# Patient Record
Sex: MALE | Race: WHITE | NOT HISPANIC OR LATINO | URBAN - METROPOLITAN AREA
[De-identification: names, ages, dates, MRNs, and addresses within clinical notes are randomized per-mention and may not be internally consistent; named-entity substitution may affect disease eponyms.]

---

## 2025-05-02 ENCOUNTER — APPOINTMENT (OUTPATIENT)
Dept: DERMATOLOGY | Facility: CLINIC | Age: 80
End: 2025-05-02

## 2025-05-02 DIAGNOSIS — L40.9 PSORIASIS: ICD-10-CM

## 2025-05-02 DIAGNOSIS — L21.9 SEBORRHEIC DERMATITIS: Primary | ICD-10-CM

## 2025-05-02 PROCEDURE — 1159F MED LIST DOCD IN RCRD: CPT | Performed by: STUDENT IN AN ORGANIZED HEALTH CARE EDUCATION/TRAINING PROGRAM

## 2025-05-02 PROCEDURE — 99204 OFFICE O/P NEW MOD 45 MIN: CPT | Performed by: STUDENT IN AN ORGANIZED HEALTH CARE EDUCATION/TRAINING PROGRAM

## 2025-05-02 RX ORDER — BETAMETHASONE VALERATE 1 MG/G
CREAM TOPICAL 2 TIMES DAILY
Qty: 45 G | Refills: 2 | Status: SHIPPED | OUTPATIENT
Start: 2025-05-02

## 2025-05-02 RX ORDER — KETOCONAZOLE 20 MG/ML
SHAMPOO, SUSPENSION TOPICAL 3 TIMES WEEKLY
Qty: 120 ML | Refills: 11 | Status: SHIPPED | OUTPATIENT
Start: 2025-05-02

## 2025-05-02 RX ORDER — HYDROCORTISONE 25 MG/G
CREAM TOPICAL 2 TIMES DAILY
Qty: 120 G | Refills: 3 | Status: SHIPPED | OUTPATIENT
Start: 2025-05-02 | End: 2025-05-16

## 2025-05-02 RX ORDER — KETOCONAZOLE 20 MG/G
CREAM TOPICAL
Qty: 60 G | Refills: 11 | Status: SHIPPED | OUTPATIENT
Start: 2025-05-02

## 2025-05-02 NOTE — PROGRESS NOTES
"Subjective     Jean Hammond is a 80 y.o. male who presents for the following: Skin Check (FBSE. Patient is concerned with dryness on scalp, and behind ears as well as dark spots throughout body. On 4/04/2025 he visited [facility] and was prescribed Betamethasone cream which has been helping clear dark areas. He was also prescribed ketoconazole shampoo, ketoconazole cream, triamcinolone, and hydrocortisone which he has not started using as of yet. Patient would like clarification on where and how to use medications.).      The patient reports he has dryness on his face and behind his ears.    Secondarily, he reports that over the last few months he has noted increased \"red spots\" all over his body, starting on his legs. He denies pain, bleeding, or itching associated with these, but their presence bothers him.    He states that while waiting for his appointment here, he went to another dermatologist who gave him several creams. Of these, he has been using betamethasone and he feels that this has been helping the spots. He has been using it twice a day for 2 weeks.    Review of Systems:  No other skin or systemic complaints other than what is documented elsewhere in the note.    The following portions of the chart were reviewed this encounter and updated as appropriate:          Skin Cancer History  Biopsy Log Book  No skin cancers from Specimen Tracking.    Additional History      Specialty Problems    None       Objective   Well appearing patient in no apparent distress; mood and affect are within normal limits.    A focused skin examination was performed. All findings within normal limits unless otherwise noted below.    Assessment/Plan   Skin Exam  1. SEBORRHEIC DERMATITIS  Head - Anterior (Face), Scalp  Erythema with overlying greasy scale.  Discussed the chronic and relapsing nature of the condition. Counseled on relation to normal yeast species on skin and body's immune reaction to it. Discussed " that goal is control, not cure, of condition.     Start ketoconazole 2% cream BID to affected area.  Start ketoconazole 2% shampoo daily to affected area. Leave on 5 minutes before rinsing.     ketoconazole (NIZOral) 2 % cream - Head - Anterior (Face)  Apply twice daily to affected areas of face    ketoconazole (NIZOral) 2 % shampoo - Scalp  Apply topically 3 times a week.  2. PSORIASIS  Right Lower Leg - Anterior  Well-demarcated erythematous papules and plaques with overlying silvery scale on lower legs, thighs, back, abdomen, and bilateral hands. BSA ~7%. Fingernail pitting noted bilaterally.  The chronic and intermittently flaring nature of this skin condition was discussed with the patient today. Discussed this is an autoinflammatory condition with a genetic predisposition that can be associated with inflammatory arthritis and increased risk of cardiovascular disease.  The various treatment options were reviewed with the patient including topical steroids which the patient had already been prescribed by an outside dermatologist.    The patient denies swollen, tender or boggy joints or morning stiffness lasting > 1 hour.  betamethasone valerate (Valisone) 0.1 % cream - Right Lower Leg - Anterior  Apply topically 2 times a day.    hydrocortisone 2.5 % cream - Right Lower Leg - Anterior  Apply topically 2 times a day for 14 days.  Related Procedures  Follow Up In Dermatology - Established Patient    Printed instructions were provided to the patient.    RTC in 6mo for psoriasis follow up.    Aixa Montoya MD, PORSCHE  PGY-3, Department of Dermatology    I saw and evaluated the patient. I personally obtained the key and critical portions of the history and physical exam or was physically present for key and critical portions performed by the resident. I reviewed the resident's documentation and discussed the patient with the resident. I agree with the resident's medical decision making as documented in the  note.    Kelin Remy MD

## 2025-05-02 NOTE — PATIENT INSTRUCTIONS
Thank you for visiting with  Dermatology today!    At your visit today, we discussed your skin rash and your dry scalp.    Going forward, we suggest the following:  - Apply betamethasone cream to rash spots on your body twice a day for 2 weeks. Take a 1 week break before repeating this cycle as needed for flares. Avoid application to your face.  - Apply hydrocortisone cream to itchy areas on the face  - Use ketoconazole shampoo in the shower. Let sit 5 minutes before rinsing.  - Apply ketoconazole cream to the dry spots on the face twice a day until they go away.

## 2025-05-02 NOTE — Clinical Note
The chronic and intermittently flaring nature of this skin condition was discussed with the patient today. Discussed this is an autoinflammatory condition with a genetic predisposition that can be associated with inflammatory arthritis and increased risk of cardiovascular disease.  The various treatment options were reviewed with the patient including topical steroids which the patient had already been prescribed by an outside dermatologist.    The patient denies swollen, tender or boggy joints or morning stiffness lasting > 1 hour.

## 2025-05-02 NOTE — Clinical Note
Discussed the chronic and relapsing nature of the condition. Counseled on relation to normal yeast species on skin and body's immune reaction to it. Discussed that goal is control, not cure, of condition.     Start ketoconazole 2% cream BID to affected area.  Start ketoconazole 2% shampoo daily to affected area. Leave on 5 minutes before rinsing.

## 2025-05-02 NOTE — Clinical Note
Well-demarcated erythematous papules and plaques with overlying silvery scale on lower legs, thighs, back, abdomen, and bilateral hands. BSA ~7%. Fingernail pitting noted bilaterally.

## 2025-11-03 ENCOUNTER — APPOINTMENT (OUTPATIENT)
Dept: DERMATOLOGY | Facility: CLINIC | Age: 80
End: 2025-11-03
Payer: MEDICARE